# Patient Record
Sex: FEMALE | Race: WHITE | NOT HISPANIC OR LATINO | Employment: STUDENT | URBAN - METROPOLITAN AREA
[De-identification: names, ages, dates, MRNs, and addresses within clinical notes are randomized per-mention and may not be internally consistent; named-entity substitution may affect disease eponyms.]

---

## 2017-08-09 ENCOUNTER — OFFICE VISIT (OUTPATIENT)
Dept: OBSTETRICS AND GYNECOLOGY | Facility: CLINIC | Age: 22
End: 2017-08-09
Payer: COMMERCIAL

## 2017-08-09 VITALS
SYSTOLIC BLOOD PRESSURE: 96 MMHG | DIASTOLIC BLOOD PRESSURE: 62 MMHG | BODY MASS INDEX: 18.79 KG/M2 | HEIGHT: 66 IN | WEIGHT: 116.88 LBS

## 2017-08-09 DIAGNOSIS — Z11.3 SCREENING EXAMINATION FOR STD (SEXUALLY TRANSMITTED DISEASE): ICD-10-CM

## 2017-08-09 DIAGNOSIS — N76.0 ACUTE VAGINITIS: Primary | ICD-10-CM

## 2017-08-09 PROCEDURE — 99203 OFFICE O/P NEW LOW 30 MIN: CPT | Mod: S$GLB,,, | Performed by: OBSTETRICS & GYNECOLOGY

## 2017-08-09 PROCEDURE — 99999 PR PBB SHADOW E&M-NEW PATIENT-LVL III: CPT | Mod: PBBFAC,,, | Performed by: OBSTETRICS & GYNECOLOGY

## 2017-08-09 PROCEDURE — 87480 CANDIDA DNA DIR PROBE: CPT

## 2017-08-09 PROCEDURE — 87591 N.GONORRHOEAE DNA AMP PROB: CPT

## 2017-08-09 PROCEDURE — 87660 TRICHOMONAS VAGIN DIR PROBE: CPT

## 2017-08-09 PROCEDURE — 3008F BODY MASS INDEX DOCD: CPT | Mod: S$GLB,,, | Performed by: OBSTETRICS & GYNECOLOGY

## 2017-08-09 RX ORDER — LEVONORGESTREL AND ETHINYL ESTRADIOL 0.15-0.03
1 KIT ORAL DAILY
Refills: 2 | COMMUNITY
Start: 2017-07-05

## 2017-08-09 RX ORDER — FLUCONAZOLE 150 MG/1
150 TABLET ORAL ONCE
Qty: 2 TABLET | Refills: 1 | Status: SHIPPED | OUTPATIENT
Start: 2017-08-09 | End: 2017-08-09

## 2017-08-09 RX ORDER — TERCONAZOLE 4 MG/G
1 CREAM VAGINAL NIGHTLY
Qty: 1 TUBE | Refills: 0 | Status: SHIPPED | OUTPATIENT
Start: 2017-08-09 | End: 2017-08-16

## 2017-08-09 NOTE — PROGRESS NOTES
"Vulvar irritation.   Red and puffy. Hurts. Started about 2 months ago. No abnormal discharge. No fevers or chills. Sexually active. Single male partner. Uses a condom.         Chief Complaint: Vaginitis     HPI:      Sena Marquez is a 22 y.o.  who presents with complaint of vulvar itch for 2 months. Reports that she has tenderness, redness, and swelling "on the left". Rare dysuria when urine hits the skin. No abnormal discharge that she has seen. No fevers or chills. She is currently sexually active with a single male partner. Uses a condom each and every time. Has not been having sex recently because of discomfort. She has not experienced symptoms like this before. Patient does not douche. Patient's last menstrual period was 2017 (approximate)..    History reviewed. No pertinent past medical history.    ROS:     GENERAL: Denies weight gain or weight loss. Feeling well overall.   SKIN: Denies rash or lesions.   ABDOMEN: Denies abdominal pain, constipation, diarrhea, nausea, vomiting, change in appetite.   URINARY: Denies frequency, dysuria, hematuria.  GYN: See HPI.    Physical Exam:      PHYSICAL EXAM:   Vitals:    17 1450   BP: 96/62   Weight: 53 kg (116 lb 13.5 oz)   Height: 5' 6" (1.676 m)   PainSc: 0-No pain     Body mass index is 18.86 kg/m².    General: No acute distress, alert and engaged  Pelvic: External genitalia with 3 cm patch of erythematous patch of skin which appears slightly desquamated on the inferior left labia majora. Urethra within normal limits.    Vagina without lesions, without discharge, without erythema, without ulcers.   Cervix without cervical motion tenderness, non-friable.   Uterus normal in size and nontender. No adnexal masses or tenderness palpated.    Assessment/Plan:     Acute vaginitis  -     Vaginosis Screen by DNA Probe  -     C. trachomatis/N. gonorrhoeae by AMP DNA Cervix  -     fluconazole (DIFLUCAN) 150 MG Tab; Take 1 tablet (150 mg total) by mouth " once. Take one tablet. If symptoms persist, repeat dose in 3 days.  Dispense: 2 tablet; Refill: 1  -     terconazole (TERAZOL 7) 0.4 % Crea; Place 1 applicator vaginally every evening. Place one application into the vagina each night at bedtime.  Dispense: 1 Tube; Refill: 0        -     Patient instructed if not better in 1 week to come back for biopsy.    Screening examination for STD (sexually transmitted disease)  -     Vaginosis Screen by DNA Probe  -     C. trachomatis/N. gonorrhoeae by AMP DNA Cervix        Will call patient with vaginal swab results.  Follow up: PRN if no improvement.      Counseling:     Patient was counseled today on vaginitis prevention, including to:  1. Avoid feminine products such as deodorant soaps, body wash, bubble bath, douches, scented toilet paper, deodorant tampons or pads, feminine wipes, chronic pad use, etc.  2.  Avoid other vulvovaginal irritants such as long hot baths, humidity, tight, synthetic clothing, chlorine and sitting around in wet bathing suits  3. Wear cotton underwear.  4. Shower immediately after exercise and change clothes  5. She was encouraged not to douche       Use of the MyChart Patient Portal discussed and encouraged during today's visit.

## 2017-08-10 LAB
C TRACH DNA SPEC QL NAA+PROBE: NOT DETECTED
CANDIDA RRNA VAG QL PROBE: NEGATIVE
G VAGINALIS RRNA GENITAL QL PROBE: NEGATIVE
N GONORRHOEA DNA SPEC QL NAA+PROBE: NOT DETECTED
T VAGINALIS RRNA GENITAL QL PROBE: NEGATIVE

## 2017-08-15 ENCOUNTER — PATIENT MESSAGE (OUTPATIENT)
Dept: OBSTETRICS AND GYNECOLOGY | Facility: CLINIC | Age: 22
End: 2017-08-15

## 2017-08-22 ENCOUNTER — TELEPHONE (OUTPATIENT)
Dept: OBSTETRICS AND GYNECOLOGY | Facility: CLINIC | Age: 22
End: 2017-08-22

## 2017-08-22 ENCOUNTER — PATIENT MESSAGE (OUTPATIENT)
Dept: OBSTETRICS AND GYNECOLOGY | Facility: CLINIC | Age: 22
End: 2017-08-22

## 2017-09-06 ENCOUNTER — PATIENT MESSAGE (OUTPATIENT)
Dept: OBSTETRICS AND GYNECOLOGY | Facility: CLINIC | Age: 22
End: 2017-09-06